# Patient Record
Sex: FEMALE | Race: WHITE | NOT HISPANIC OR LATINO | Employment: OTHER | URBAN - METROPOLITAN AREA
[De-identification: names, ages, dates, MRNs, and addresses within clinical notes are randomized per-mention and may not be internally consistent; named-entity substitution may affect disease eponyms.]

---

## 2022-02-25 PROCEDURE — 99284 EMERGENCY DEPT VISIT MOD MDM: CPT

## 2022-02-26 ENCOUNTER — APPOINTMENT (OUTPATIENT)
Dept: RADIOLOGY | Facility: MEDICAL CENTER | Age: 77
End: 2022-02-26
Attending: EMERGENCY MEDICINE
Payer: COMMERCIAL

## 2022-02-26 ENCOUNTER — HOSPITAL ENCOUNTER (EMERGENCY)
Facility: MEDICAL CENTER | Age: 77
End: 2022-02-26
Attending: EMERGENCY MEDICINE
Payer: COMMERCIAL

## 2022-02-26 VITALS
SYSTOLIC BLOOD PRESSURE: 138 MMHG | HEART RATE: 84 BPM | OXYGEN SATURATION: 97 % | DIASTOLIC BLOOD PRESSURE: 70 MMHG | BODY MASS INDEX: 34.77 KG/M2 | RESPIRATION RATE: 16 BRPM | TEMPERATURE: 97.5 F | WEIGHT: 196.21 LBS | HEIGHT: 63 IN

## 2022-02-26 DIAGNOSIS — G43.009 MIGRAINE WITHOUT AURA AND WITHOUT STATUS MIGRAINOSUS, NOT INTRACTABLE: ICD-10-CM

## 2022-02-26 DIAGNOSIS — S50.11XA CONTUSION OF RIGHT FOREARM, INITIAL ENCOUNTER: ICD-10-CM

## 2022-02-26 PROCEDURE — 700111 HCHG RX REV CODE 636 W/ 250 OVERRIDE (IP): Performed by: EMERGENCY MEDICINE

## 2022-02-26 PROCEDURE — 96374 THER/PROPH/DIAG INJ IV PUSH: CPT

## 2022-02-26 PROCEDURE — 73090 X-RAY EXAM OF FOREARM: CPT | Mod: RT

## 2022-02-26 PROCEDURE — 96375 TX/PRO/DX INJ NEW DRUG ADDON: CPT

## 2022-02-26 RX ORDER — DIPHENHYDRAMINE HYDROCHLORIDE 50 MG/ML
50 INJECTION INTRAMUSCULAR; INTRAVENOUS ONCE
Status: COMPLETED | OUTPATIENT
Start: 2022-02-26 | End: 2022-02-26

## 2022-02-26 RX ORDER — KETOROLAC TROMETHAMINE 30 MG/ML
30 INJECTION, SOLUTION INTRAMUSCULAR; INTRAVENOUS ONCE
Status: COMPLETED | OUTPATIENT
Start: 2022-02-26 | End: 2022-02-26

## 2022-02-26 RX ORDER — PROCHLORPERAZINE EDISYLATE 5 MG/ML
10 INJECTION INTRAMUSCULAR; INTRAVENOUS ONCE
Status: COMPLETED | OUTPATIENT
Start: 2022-02-26 | End: 2022-02-26

## 2022-02-26 RX ADMIN — KETOROLAC TROMETHAMINE 30 MG: 30 INJECTION, SOLUTION INTRAMUSCULAR at 01:46

## 2022-02-26 RX ADMIN — PROCHLORPERAZINE EDISYLATE 10 MG: 5 INJECTION INTRAMUSCULAR; INTRAVENOUS at 01:46

## 2022-02-26 RX ADMIN — DIPHENHYDRAMINE HYDROCHLORIDE 50 MG: 50 INJECTION INTRAMUSCULAR; INTRAVENOUS at 01:48

## 2022-02-26 NOTE — ED TRIAGE NOTES
"Chief Complaint   Patient presents with   • Arm Pain     Pt states that elevator doors closed on her Rt forearm x7 hours ago. Pt states that she had an \"instant migraine\" at time of incident. Pt states that she had some tingling sensations and bilateral arm pains \"a few hours later\". Pt denies chest pain.     Pt educated upon triage process and told to inform  staff of any changes in condition so that Pt may be reassessed. No further questions at this time. Pt sitting out in lobby.    "

## 2022-02-26 NOTE — DISCHARGE INSTRUCTIONS
Apply ice to your forearm for the next 24 hours then moist heat  Take your regular migraine medications as needed  Follow-up with your primary care provider when you return home to [place].

## 2022-02-26 NOTE — ED NOTES
Daughter reports pt had elevator close on her RFA around 1515 today. ROM and CMS intact, small bruise noted. Pt takes 81mg ASA daily. Pt reports pain when hand flexion and picking up heavy things. Cap refill < 3, distal pulse present.

## 2022-02-26 NOTE — ED PROVIDER NOTES
"ED Provider Note     Scribed for Raquel Leavitt D.O. by Suyapa Hannon. 2/26/2022, 12:50 AM.     Primary care provider: None noted  Means of arrival: Walk-In         History obtained from: Patient  History limited by: None    CHIEF COMPLAINT  Chief Complaint   Patient presents with   • Arm Pain     Pt states that elevator doors closed on her Rt forearm x7 hours ago. Pt states that she had an \"instant migraine\" at time of incident. Pt states that she had some tingling sensations and bilateral arm pains \"a few hours later\". Pt denies chest pain.       HPI  Dottie Cheung is a 76 y.o. female who presents to the emergency Department with right forearm pain onset 7 hours ago secondary to having her arm closed in an elevator. She describes the pain as \"aching\". The patient states that she developed an instant migraine following the incident, and there was associated tingling sensations, photophobia, and nausea. She states that she has had chronic migraines since she was 19, but has not endured one this bad for almost 10 years. She has not had an episode of emesis and further denies chest pain. She takes aspirin daily.     REVIEW OF SYSTEMS  Pertinent positives include right forearm pain and migraine. Pertinent negatives include no emesis, chest pain.   See HPI for further details.     PAST MEDICAL HISTORY  Migraine headaches    FAMILY HISTORY  None noted on chart review    SOCIAL HISTORY      Social History     Substance and Sexual Activity   Drug Use None noted       SURGICAL HISTORY  None noted on chart review    CURRENT MEDICATIONS  Fioricet with codeine    ALLERGIES  No Known Allergies    PHYSICAL EXAM  VITAL SIGNS: /67   Pulse 90   Temp 36.2 °C (97.2 °F) (Temporal)   Resp 17   Ht 1.6 m (5' 3\")   Wt 89 kg (196 lb 3.4 oz)   SpO2 95%   BMI 34.76 kg/m²   Constitutional: Patient is well developed, well nourished.Mild distress, very photophobic wearing her sunglasses.  HENT: Normocephalic, atraumatic.  " Oropharynx moist without erythema or exudates.  Eyes: PERRL, EOMI, Conjunctiva without erythema or exudates.   Neck: Supple.  Lymphatic: No lymphadenopathy noted.   Cardiovascular: Normal heart rate and Regular rhythm. No murmur  Thorax & Lungs: Clear and equal breath sounds with good excursion. No respiratory distress  Abdomen: Morbidly obese. Bowel sounds normal in all four quadrants. Soft,nontender  Skin: See extremities. Warm, Dry  Back: No cervical, thoracic, or lumbosacral tenderness.   Extremities: Right forearm 1 cm rounded hematoma on the radial aspect of the distal 1/3 forearm. Peripheral pulses 4/4   Musculoskeletal: Normal range of motion in all major joints.   Neurologic: Photosensitive. Prefers sunglasses on due to migraine. Alert & oriented x 3, Normal motor function, Normal sensory function, No lateralizing or focal deficits noted. DTR's 4/4 bilaterally.  Psychiatric: Affect normal, Judgment normal, Mood normal.     DIAGNOSTICS/PROCEDURES    RADIOLOGY/PROCEDURES  DX-FOREARM RIGHT   Final Result      No acute osseous abnormality.        Results and radiologist interpretation reviewed by me.     COURSE & MEDICAL DECISION MAKING  Pertinent Labs & Imaging studies reviewed. (See chart for details)    12:50 AM - Patient seen and evaluated at bedside. Ordered for DX-forearm right to evaluate. Patient will be treated with Toradol, Benadryl, and Compazine for her symptoms.     2:33 AM - Patient reevaluated at bedside. Discussed plan for discharge; I advised the patient to follow-up with PCP as needed, and to return to the [facility] ED with any new or worsening symptoms, including worsening pain and migraines. Patient was given the opportunity for questions. I addressed all questions or concerns at this time and they verbalize agreement to the plan of care.  Patient is to take her current pain medications as needed for migraine, ibuprofen for pain, ice for 24 hours then moist heat, follow-up with her primary care  provider when she returns back to [place].  She is discharged in the care of her friend in stable and improved condition.      DISPOSITION:  Patient will be discharged home in stable condition.    FOLLOW UP:  Your physician in [place]    Schedule an appointment as soon as possible for a visit in 3 days  As needed      OUTPATIENT MEDICATIONS:  There are no discharge medications for this patient.    FINAL IMPRESSION  1. Contusion of right forearm, initial encounter    2. Migraine without aura and without status migrainosus, not intractable         Suyapa LEDESMA (Arunlfo), am scribing for, and in the presence of, Raquel Leavitt D.O..    Electronically signed by: Suyapa Hannon (Arnulfo), 2/26/2022    IRaquel D.O. personally performed the services described in this documentation, as scribed by Suyapa Hannon in my presence, and it is both accurate and complete.    The note accurately reflects work and decisions made by me.  Raquel Leavitt D.O.  2/26/2022  4:47 AM